# Patient Record
Sex: FEMALE | Race: WHITE | NOT HISPANIC OR LATINO | Employment: UNEMPLOYED | URBAN - METROPOLITAN AREA
[De-identification: names, ages, dates, MRNs, and addresses within clinical notes are randomized per-mention and may not be internally consistent; named-entity substitution may affect disease eponyms.]

---

## 2022-04-28 ENCOUNTER — HISTORICAL (OUTPATIENT)
Dept: LAB | Facility: HOSPITAL | Age: 37
End: 2022-04-28

## 2022-04-28 LAB
ABS NEUT (OLG): 4.4 (ref 1.5–6.9)
ALBUMIN SERPL-MCNC: 3.9 G/DL (ref 3.5–5)
ALBUMIN/GLOB SERPL: 1.3 {RATIO} (ref 1.1–2)
ALP SERPL-CCNC: 70 U/L (ref 40–150)
ALT SERPL-CCNC: 20 U/L (ref 0–55)
AMYLASE SERPL-CCNC: 68 U/L (ref 25–125)
AST SERPL-CCNC: 22 U/L (ref 5–34)
BASOPHILS # BLD AUTO: 0 10*3/UL (ref 0–0.1)
BASOPHILS NFR BLD AUTO: 0 % (ref 0–1)
BILIRUB SERPL-MCNC: 0.2 MG/DL
BILIRUBIN DIRECT+TOT PNL SERPL-MCNC: 0.1 (ref 0–0.5)
BILIRUBIN DIRECT+TOT PNL SERPL-MCNC: 0.1 (ref 0–0.8)
BUN SERPL-MCNC: 13 MG/DL (ref 7–18.7)
CALCIUM SERPL-MCNC: 9.2 MG/DL (ref 8.7–10.5)
CHLORIDE SERPL-SCNC: 111 MMOL/L (ref 98–107)
CO2 SERPL-SCNC: 25 MMOL/L (ref 22–29)
CREAT SERPL-MCNC: 0.73 MG/DL (ref 0.55–1.02)
EOSINOPHIL # BLD AUTO: 0.2 10*3/UL (ref 0–0.6)
EOSINOPHIL NFR BLD AUTO: 2 % (ref 0–5)
ERYTHROCYTE [DISTWIDTH] IN BLOOD BY AUTOMATED COUNT: 13.6 % (ref 11.5–17)
GLOBULIN SER-MCNC: 3 G/DL (ref 2.4–3.5)
GLUCOSE SERPL-MCNC: 57 MG/DL (ref 74–100)
HCT VFR BLD AUTO: 38.9 % (ref 36–48)
HEMOLYSIS INTERF INDEX SERPL-ACNC: 5
HGB BLD-MCNC: 11.9 G/DL (ref 12–16)
ICTERIC INTERF INDEX SERPL-ACNC: 0
IMM GRANULOCYTES # BLD AUTO: 0.03 10*3/UL (ref 0–0.02)
IMM GRANULOCYTES NFR BLD AUTO: 0.4 % (ref 0–0.43)
LIPASE SERPL-CCNC: 36 U/L
LIPEMIC INTERF INDEX SERPL-ACNC: 10
LYMPHOCYTES # BLD AUTO: 2.5 10*3/UL (ref 0.5–4.1)
LYMPHOCYTES NFR BLD AUTO: 32 % (ref 15–40)
MANUAL DIFF? (OHS): NO
MCH RBC QN AUTO: 28 PG (ref 27–34)
MCHC RBC AUTO-ENTMCNC: 31 G/DL (ref 31–36)
MCV RBC AUTO: 90 FL (ref 80–99)
MONOCYTES # BLD AUTO: 0.7 10*3/UL (ref 0–1.1)
MONOCYTES NFR BLD AUTO: 9 % (ref 4–12)
NEUTROPHILS # BLD AUTO: 4.4 10*3/UL (ref 1.5–6.9)
NEUTROPHILS NFR BLD AUTO: 55 % (ref 43–75)
PLATELET # BLD AUTO: 211 10*3/UL (ref 140–400)
PMV BLD AUTO: 10.5 FL (ref 6.8–10)
POTASSIUM SERPL-SCNC: 4.2 MMOL/L (ref 3.5–5.1)
PROT SERPL-MCNC: 6.9 G/DL (ref 6.4–8.3)
RBC # BLD AUTO: 4.31 10*6/UL (ref 4.2–5.4)
SODIUM SERPL-SCNC: 141 MMOL/L (ref 136–145)
WBC # SPEC AUTO: 7.9 10*3/UL (ref 4.5–11.5)

## 2022-04-29 ENCOUNTER — HISTORICAL (OUTPATIENT)
Dept: RADIOLOGY | Facility: HOSPITAL | Age: 37
End: 2022-04-29

## 2022-06-01 ENCOUNTER — APPOINTMENT (OUTPATIENT)
Dept: LAB | Facility: HOSPITAL | Age: 37
End: 2022-06-01
Attending: NURSE PRACTITIONER
Payer: MEDICAID

## 2022-06-01 PROCEDURE — 30000890 MAYO GENERIC ORDERABLE

## 2022-06-01 PROCEDURE — 81162 BRCA1&2 GEN FULL SEQ DUP/DEL: CPT

## 2022-07-06 DIAGNOSIS — R11.2 NAUSEA AND VOMITING, INTRACTABILITY OF VOMITING NOT SPECIFIED, UNSPECIFIED VOMITING TYPE: Primary | ICD-10-CM

## 2022-07-06 DIAGNOSIS — R10.9 ABDOMINAL PAIN, UNSPECIFIED ABDOMINAL LOCATION: ICD-10-CM

## 2022-08-23 ENCOUNTER — HISTORICAL (OUTPATIENT)
Dept: ADMINISTRATIVE | Facility: HOSPITAL | Age: 37
End: 2022-08-23

## 2022-09-14 ENCOUNTER — HISTORICAL (OUTPATIENT)
Dept: ADMINISTRATIVE | Facility: HOSPITAL | Age: 37
End: 2022-09-14

## 2022-10-10 ENCOUNTER — HISTORICAL (OUTPATIENT)
Dept: ADMINISTRATIVE | Facility: HOSPITAL | Age: 37
End: 2022-10-10

## 2022-10-18 ENCOUNTER — HISTORICAL (OUTPATIENT)
Dept: ADMINISTRATIVE | Facility: HOSPITAL | Age: 37
End: 2022-10-18

## 2022-10-28 ENCOUNTER — HISTORICAL (OUTPATIENT)
Dept: ADMINISTRATIVE | Facility: HOSPITAL | Age: 37
End: 2022-10-28

## 2022-11-30 ENCOUNTER — HISTORICAL (OUTPATIENT)
Dept: ADMINISTRATIVE | Facility: HOSPITAL | Age: 37
End: 2022-11-30

## 2022-12-01 ENCOUNTER — HISTORICAL (OUTPATIENT)
Dept: ADMINISTRATIVE | Facility: HOSPITAL | Age: 37
End: 2022-12-01

## 2022-12-01 LAB
HUMAN PAPILLOMAVIRUS (HPV): NORMAL
PAP RECOMMENDATION EXT: NORMAL
PAP SMEAR: NORMAL

## 2022-12-09 ENCOUNTER — HISTORICAL (OUTPATIENT)
Dept: ADMINISTRATIVE | Facility: HOSPITAL | Age: 37
End: 2022-12-09

## 2023-01-06 ENCOUNTER — HISTORICAL (OUTPATIENT)
Dept: ADMINISTRATIVE | Facility: HOSPITAL | Age: 38
End: 2023-01-06
Payer: MEDICAID

## 2023-01-18 ENCOUNTER — DOCUMENTATION ONLY (OUTPATIENT)
Dept: ADMINISTRATIVE | Facility: HOSPITAL | Age: 38
End: 2023-01-18
Payer: MEDICAID

## 2023-02-16 ENCOUNTER — TELEPHONE (OUTPATIENT)
Dept: FAMILY MEDICINE | Facility: CLINIC | Age: 38
End: 2023-02-16
Payer: MEDICAID

## 2023-02-16 NOTE — TELEPHONE ENCOUNTER
----- Message from Nini Will MA sent at 2/16/2023 10:46 AM CST -----  Patient would like an appt today. She has one Tuesday but does not want to wait that long. She is in a lot of pain and has a lot going on--sound like anxiety on the phone but she didn't say that just that she has a lot going on and needs to see Cheyanne today.    [phone]

## 2023-02-16 NOTE — TELEPHONE ENCOUNTER
----- Message from Cheyanne Azar MA sent at 2/16/2023 10:35 AM CST -----  Regarding: RE: call pt back  Pt would like a referral to [facility] for her migraines and due to the results of her EMG  ----- Message -----  From: Laureen Hernandez  Sent: 2/15/2023   4:13 PM CST  To: Cheyanne Azar MA  Subject: call pt back                                     Pt. Would like for you to find her an closer rheumatologist [facility] around [facility].    Please return her call [phone]

## 2023-02-16 NOTE — TELEPHONE ENCOUNTER
Called and informed pt that she can speak to Cheyanne Becerra about the referral on her up coming appt Wed. She voiced understanding.

## 2023-02-20 RX ORDER — DICLOFENAC SODIUM 50 MG/1
50 TABLET, DELAYED RELEASE ORAL 2 TIMES DAILY
COMMUNITY
Start: 2023-01-31 | End: 2023-02-22

## 2023-02-20 RX ORDER — DEXTROAMPHETAMINE SULFATE, DEXTROAMPHETAMINE SACCHARATE, AMPHETAMINE SULFATE AND AMPHETAMINE ASPARTATE 5; 5; 5; 5 MG/1; MG/1; MG/1; MG/1
1 CAPSULE, EXTENDED RELEASE ORAL EVERY MORNING
COMMUNITY
Start: 2023-01-30 | End: 2023-02-22

## 2023-02-20 RX ORDER — LEVOTHYROXINE SODIUM 50 UG/1
50 TABLET ORAL DAILY
COMMUNITY
Start: 2023-01-30 | End: 2023-06-06 | Stop reason: SDUPTHER

## 2023-02-20 RX ORDER — FERROUS SULFATE 325(65) MG
1 TABLET, DELAYED RELEASE (ENTERIC COATED) ORAL EVERY OTHER DAY
COMMUNITY
Start: 2023-01-06 | End: 2023-03-02

## 2023-02-20 RX ORDER — IBUPROFEN 600 MG/1
600 TABLET ORAL 3 TIMES DAILY
COMMUNITY
Start: 2022-12-02 | End: 2023-02-22

## 2023-02-20 RX ORDER — DICLOFENAC SODIUM 10 MG/G
2 GEL TOPICAL
COMMUNITY
Start: 2022-12-08 | End: 2023-02-22

## 2023-02-20 RX ORDER — BUSPIRONE HYDROCHLORIDE 15 MG/1
1 TABLET ORAL DAILY
COMMUNITY
Start: 2023-02-15 | End: 2023-02-22 | Stop reason: DRUGHIGH

## 2023-02-20 RX ORDER — GABAPENTIN 400 MG/1
400 CAPSULE ORAL NIGHTLY
COMMUNITY
Start: 2023-01-30

## 2023-02-20 RX ORDER — BUPROPION HCL 300 MG
300 TABLET, EXTENDED RELEASE 24 HR ORAL EVERY MORNING
COMMUNITY
Start: 2023-01-30

## 2023-02-22 ENCOUNTER — OFFICE VISIT (OUTPATIENT)
Dept: FAMILY MEDICINE | Facility: CLINIC | Age: 38
End: 2023-02-22
Payer: MEDICAID

## 2023-02-22 VITALS
WEIGHT: 154.31 LBS | BODY MASS INDEX: 25.71 KG/M2 | DIASTOLIC BLOOD PRESSURE: 70 MMHG | HEART RATE: 77 BPM | SYSTOLIC BLOOD PRESSURE: 118 MMHG | TEMPERATURE: 99 F | OXYGEN SATURATION: 99 % | HEIGHT: 65 IN

## 2023-02-22 DIAGNOSIS — E04.2 MULTIPLE THYROID NODULES: ICD-10-CM

## 2023-02-22 DIAGNOSIS — G43.809 OTHER MIGRAINE WITHOUT STATUS MIGRAINOSUS, NOT INTRACTABLE: ICD-10-CM

## 2023-02-22 DIAGNOSIS — R76.8 POSITIVE ANA (ANTINUCLEAR ANTIBODY): ICD-10-CM

## 2023-02-22 DIAGNOSIS — M25.50 ARTHRALGIA, UNSPECIFIED JOINT: Primary | ICD-10-CM

## 2023-02-22 PROBLEM — Z98.890 HISTORY OF AUGMENTATION OF BOTH BREASTS: Status: ACTIVE | Noted: 2023-02-22

## 2023-02-22 PROBLEM — Z86.711 HISTORY OF PULMONARY EMBOLISM: Status: ACTIVE | Noted: 2023-02-22

## 2023-02-22 PROBLEM — E03.9 HYPOTHYROIDISM: Status: ACTIVE | Noted: 2023-02-22

## 2023-02-22 PROBLEM — N64.4 BREAST PAIN: Status: ACTIVE | Noted: 2023-02-22

## 2023-02-22 PROBLEM — K83.8 COMMON BILE DUCT DILATION: Status: ACTIVE | Noted: 2021-04-26

## 2023-02-22 PROBLEM — F43.10 PTSD (POST-TRAUMATIC STRESS DISORDER): Status: ACTIVE | Noted: 2023-02-22

## 2023-02-22 PROBLEM — I82.811 ACUTE SUPERFICIAL VENOUS THROMBOSIS OF RIGHT LOWER EXTREMITY: Status: ACTIVE | Noted: 2021-04-26

## 2023-02-22 PROBLEM — M79.7 FIBROMYALGIA: Status: ACTIVE | Noted: 2023-02-22

## 2023-02-22 PROBLEM — F33.9 MAJOR DEPRESSION, RECURRENT: Status: ACTIVE | Noted: 2023-02-22

## 2023-02-22 PROBLEM — M54.2 CERVICALGIA: Status: ACTIVE | Noted: 2023-02-22

## 2023-02-22 PROBLEM — T14.8XXA INJURY TO NERVES: Status: ACTIVE | Noted: 2023-02-22

## 2023-02-22 PROBLEM — F41.9 ANXIETY: Status: ACTIVE | Noted: 2023-02-22

## 2023-02-22 PROBLEM — Z80.3 FAMILY HISTORY OF BREAST CANCER: Status: ACTIVE | Noted: 2023-02-22

## 2023-02-22 PROBLEM — M13.0 POLYARTHRITIS: Status: ACTIVE | Noted: 2023-02-22

## 2023-02-22 PROBLEM — R51.9 FREQUENT HEADACHES: Status: ACTIVE | Noted: 2023-02-22

## 2023-02-22 PROBLEM — D50.9 IRON DEFICIENCY ANEMIA: Status: ACTIVE | Noted: 2023-02-22

## 2023-02-22 PROCEDURE — 82728 ASSAY OF FERRITIN: CPT | Performed by: NURSE PRACTITIONER

## 2023-02-22 PROCEDURE — 3078F DIAST BP <80 MM HG: CPT | Mod: CPTII,,, | Performed by: NURSE PRACTITIONER

## 2023-02-22 PROCEDURE — 1159F PR MEDICATION LIST DOCUMENTED IN MEDICAL RECORD: ICD-10-PCS | Mod: CPTII,,, | Performed by: NURSE PRACTITIONER

## 2023-02-22 PROCEDURE — 1160F PR REVIEW ALL MEDS BY PRESCRIBER/CLIN PHARMACIST DOCUMENTED: ICD-10-PCS | Mod: CPTII,,, | Performed by: NURSE PRACTITIONER

## 2023-02-22 PROCEDURE — 83550 IRON BINDING TEST: CPT | Performed by: NURSE PRACTITIONER

## 2023-02-22 PROCEDURE — 84443 ASSAY THYROID STIM HORMONE: CPT | Performed by: NURSE PRACTITIONER

## 2023-02-22 PROCEDURE — 1159F MED LIST DOCD IN RCRD: CPT | Mod: CPTII,,, | Performed by: NURSE PRACTITIONER

## 2023-02-22 PROCEDURE — 99214 OFFICE O/P EST MOD 30 MIN: CPT | Mod: ,,, | Performed by: NURSE PRACTITIONER

## 2023-02-22 PROCEDURE — 85025 COMPLETE CBC W/AUTO DIFF WBC: CPT | Performed by: NURSE PRACTITIONER

## 2023-02-22 PROCEDURE — 3008F PR BODY MASS INDEX (BMI) DOCUMENTED: ICD-10-PCS | Mod: CPTII,,, | Performed by: NURSE PRACTITIONER

## 2023-02-22 PROCEDURE — 99214 PR OFFICE/OUTPT VISIT, EST, LEVL IV, 30-39 MIN: ICD-10-PCS | Mod: ,,, | Performed by: NURSE PRACTITIONER

## 2023-02-22 PROCEDURE — 3074F SYST BP LT 130 MM HG: CPT | Mod: CPTII,,, | Performed by: NURSE PRACTITIONER

## 2023-02-22 PROCEDURE — 3074F PR MOST RECENT SYSTOLIC BLOOD PRESSURE < 130 MM HG: ICD-10-PCS | Mod: CPTII,,, | Performed by: NURSE PRACTITIONER

## 2023-02-22 PROCEDURE — 1160F RVW MEDS BY RX/DR IN RCRD: CPT | Mod: CPTII,,, | Performed by: NURSE PRACTITIONER

## 2023-02-22 PROCEDURE — 3008F BODY MASS INDEX DOCD: CPT | Mod: CPTII,,, | Performed by: NURSE PRACTITIONER

## 2023-02-22 PROCEDURE — 3078F PR MOST RECENT DIASTOLIC BLOOD PRESSURE < 80 MM HG: ICD-10-PCS | Mod: CPTII,,, | Performed by: NURSE PRACTITIONER

## 2023-02-22 PROCEDURE — 83540 ASSAY OF IRON: CPT | Performed by: NURSE PRACTITIONER

## 2023-02-22 RX ORDER — DEXTROAMPHETAMINE SULFATE, DEXTROAMPHETAMINE SACCHARATE, AMPHETAMINE SULFATE AND AMPHETAMINE ASPARTATE 7.5; 7.5; 7.5; 7.5 MG/1; MG/1; MG/1; MG/1
1 CAPSULE, EXTENDED RELEASE ORAL EVERY MORNING
COMMUNITY
Start: 2023-02-20

## 2023-02-22 RX ORDER — BUSPIRONE HYDROCHLORIDE 7.5 MG/1
7.5 TABLET ORAL DAILY
COMMUNITY
Start: 2023-02-20

## 2023-02-22 RX ORDER — PREDNISONE 20 MG/1
40 TABLET ORAL DAILY
Qty: 10 TABLET | Refills: 0 | Status: SHIPPED | OUTPATIENT
Start: 2023-02-22 | End: 2023-03-02

## 2023-02-22 NOTE — PROGRESS NOTES
Patient ID: Bethanie Umana is a 37 y.o. female.    Chief Complaint: Knee Pain (L knee ) and Neck Pain                     History of Present Illness:  The patient is 37 y.o. White female for evaluation and management with a chief complaint of Knee Pain (L knee ) and Neck Pain .  She continues to complain of generalized joint pain.  Pain is most pronounced to bilateral hands and feet.  She complains of pain, warmth, and edema to all joints of hands and wrist.  Morning stiffness lasting over 1.5 hours in duration.  Has tried multiple NSAIDs in the past including naproxen, ibuprofen, and diclofenac without improvement.  Referral was sent to rheumatology (positive SHAHZAD) at [facility] but she does not have an appointment for the next 3-4 months.  She is requesting referral to [facility].  She reports that pain has become debilitating and is now hard to do her daily activities.  Hands are very easily fatigued and she often has to stop to rest.  She reports that she never had issues with her joints until she had breast augmentation in 2017.     Continues to complain of pain to her thoracic spine.  She reports that it is very tender to the touch.  She describes as a very sharp, intense pain.  It worsens with movement or with direct pressure.  X-ray was normal in the past.    Would like a referral to Dr. Landa for migraine headaches.  She reports a daily headache.  In addition to the chronic daily headache, she experiences migraines several times per month that last up to 3 days in duration.  She reports experiencing at least 10-15 headaches a month.  She has had these headaches for the last several years.  Headaches associated with nausea, vomiting, and photophobia.  She reports positive family history of migraines in dad and sister.  Received Botox injections while living in [place].      ROS: See HPI      Past Medical History:  has a past medical history of Anxiety, Breast pain, Cervicalgia,  "Fibromyalgia, Frequent headaches, History of pulmonary embolism, Hypothyroidism, Iron deficiency anemia, Major depression, recurrent, Multiple thyroid nodules, Nerve damage, Polyarthritis, and PTSD (post-traumatic stress disorder).    Current Outpatient Medications   Medication Instructions    ADDERALL XR 30 mg 24 hr capsule 1 capsule, Oral, Every morning    busPIRone (BUSPAR) 7.5 mg, Oral, 3 times daily    ferrous sulfate 325 (65 FE) MG EC tablet 1 tablet, Oral, Every other day    gabapentin (NEURONTIN) 400 mg, Oral, Nightly    levothyroxine (SYNTHROID) 50 mcg, Oral, Daily    predniSONE (DELTASONE) 40 mg, Oral, Daily    WELLBUTRIN  mg, Oral, Every morning       Patient is allergic to latex, penicillin, sulfa (sulfonamide antibiotics), sulfur, and adhesive tape-silicones.       Visit Vitals  /70 (BP Location: Right arm, Patient Position: Sitting)   Pulse 77   Temp 98.6 °F (37 °C) (Temporal)   Ht 5' 4.5" (1.638 m)   Wt 70 kg (154 lb 5.2 oz)   LMP 01/22/2023 (Approximate)   SpO2 99%   BMI 26.08 kg/m²         Physical Exam  Vitals reviewed.   Constitutional:       Appearance: Normal appearance.   Cardiovascular:      Rate and Rhythm: Normal rate and regular rhythm.      Heart sounds: Normal heart sounds.   Pulmonary:      Effort: Pulmonary effort is normal.      Breath sounds: Normal breath sounds.   Abdominal:      General: Bowel sounds are normal.      Palpations: Abdomen is soft.   Musculoskeletal:         General: Tenderness (Generalized to all joints. No edema or erythema) present. Normal range of motion.      Thoracic back: Tenderness and bony tenderness present.   Skin:     General: Skin is warm and dry.   Neurological:      Mental Status: She is alert.   Psychiatric:         Thought Content: Thought content normal.         Judgment: Judgment normal.             Assessment/Plan:    ICD-10-CM ICD-9-CM   1. Arthralgia, unspecified joint  M25.50 719.40   2. Other migraine without status migrainosus, not " intractable  G43.809 346.80   3. Positive SHAHZAD (antinuclear antibody)  R76.8 795.79       1. Arthralgia, unspecified joint  No improvement with NSAIDs  - predniSONE (DELTASONE) 20 MG tablet; Take 2 tablets (40 mg total) by mouth once daily. for 5 days  Dispense: 10 tablet; Refill: 0  - Ambulatory referral/consult to Rheumatology; Future    2. Other migraine without status migrainosus, not intractable  - Ambulatory referral/consult to Neurology at request of patient     3. Positive SHAHZAD (antinuclear antibody)  - Ambulatory referral/consult to Rheumatology; Future     4. Pain in thoracic spine  X-ray t spine 10/10/22: No acute fractures or spondylolisthesis  are noted.  No improvement with NSAIDs  Encourage massage, ice/heat  Refer to PT     Keep scheduled follow-up return to clinic sooner if needed    Future Appointments   Date Time Provider Department Center   2/24/2023 10:10 AM LAB, [facility] LABORATORY DRAW STATION JOANNA Horner [facility]   3/2/2023  1:00 PM ANASTASIYA Stern [facility]   5/4/2023  3:00 PM RHEUMATOLOGY ACCESS CLINIC [facility]         ANASTASIYA Stern

## 2023-02-24 ENCOUNTER — TELEPHONE (OUTPATIENT)
Dept: FAMILY MEDICINE | Facility: CLINIC | Age: 38
End: 2023-02-24
Payer: MEDICAID

## 2023-02-24 NOTE — TELEPHONE ENCOUNTER
----- Message from ANASTASIYA Stern sent at 2/23/2023  2:06 PM CST -----  Please have her stop her iron.  Her iron levels are a little above normal.  We can discuss the rest of her results at her upcoming appointment.

## 2023-03-02 ENCOUNTER — OFFICE VISIT (OUTPATIENT)
Dept: FAMILY MEDICINE | Facility: CLINIC | Age: 38
End: 2023-03-02
Payer: MEDICAID

## 2023-03-02 VITALS
TEMPERATURE: 98 F | BODY MASS INDEX: 26.08 KG/M2 | HEART RATE: 60 BPM | DIASTOLIC BLOOD PRESSURE: 70 MMHG | OXYGEN SATURATION: 99 % | HEIGHT: 64 IN | WEIGHT: 152.75 LBS | SYSTOLIC BLOOD PRESSURE: 110 MMHG

## 2023-03-02 DIAGNOSIS — E03.9 HYPOTHYROIDISM, UNSPECIFIED TYPE: ICD-10-CM

## 2023-03-02 DIAGNOSIS — D50.9 IRON DEFICIENCY ANEMIA, UNSPECIFIED IRON DEFICIENCY ANEMIA TYPE: Primary | ICD-10-CM

## 2023-03-02 PROBLEM — F43.10 POSTTRAUMATIC STRESS DISORDER: Status: ACTIVE | Noted: 2022-07-01

## 2023-03-02 PROBLEM — N76.6 GENITAL LABIAL ULCER: Status: ACTIVE | Noted: 2022-01-02

## 2023-03-02 PROBLEM — E66.3 OVERWEIGHT WITH BODY MASS INDEX (BMI) 25.0-29.9: Status: ACTIVE | Noted: 2023-03-02

## 2023-03-02 PROCEDURE — 1159F MED LIST DOCD IN RCRD: CPT | Mod: CPTII,,, | Performed by: NURSE PRACTITIONER

## 2023-03-02 PROCEDURE — 1160F RVW MEDS BY RX/DR IN RCRD: CPT | Mod: CPTII,,, | Performed by: NURSE PRACTITIONER

## 2023-03-02 PROCEDURE — 3008F PR BODY MASS INDEX (BMI) DOCUMENTED: ICD-10-PCS | Mod: CPTII,,, | Performed by: NURSE PRACTITIONER

## 2023-03-02 PROCEDURE — 99213 PR OFFICE/OUTPT VISIT, EST, LEVL III, 20-29 MIN: ICD-10-PCS | Mod: ,,, | Performed by: NURSE PRACTITIONER

## 2023-03-02 PROCEDURE — 3074F SYST BP LT 130 MM HG: CPT | Mod: CPTII,,, | Performed by: NURSE PRACTITIONER

## 2023-03-02 PROCEDURE — 3078F PR MOST RECENT DIASTOLIC BLOOD PRESSURE < 80 MM HG: ICD-10-PCS | Mod: CPTII,,, | Performed by: NURSE PRACTITIONER

## 2023-03-02 PROCEDURE — 99213 OFFICE O/P EST LOW 20 MIN: CPT | Mod: ,,, | Performed by: NURSE PRACTITIONER

## 2023-03-02 PROCEDURE — 3008F BODY MASS INDEX DOCD: CPT | Mod: CPTII,,, | Performed by: NURSE PRACTITIONER

## 2023-03-02 PROCEDURE — 1159F PR MEDICATION LIST DOCUMENTED IN MEDICAL RECORD: ICD-10-PCS | Mod: CPTII,,, | Performed by: NURSE PRACTITIONER

## 2023-03-02 PROCEDURE — 1160F PR REVIEW ALL MEDS BY PRESCRIBER/CLIN PHARMACIST DOCUMENTED: ICD-10-PCS | Mod: CPTII,,, | Performed by: NURSE PRACTITIONER

## 2023-03-02 PROCEDURE — 3074F PR MOST RECENT SYSTOLIC BLOOD PRESSURE < 130 MM HG: ICD-10-PCS | Mod: CPTII,,, | Performed by: NURSE PRACTITIONER

## 2023-03-02 PROCEDURE — 3078F DIAST BP <80 MM HG: CPT | Mod: CPTII,,, | Performed by: NURSE PRACTITIONER

## 2023-03-02 RX ORDER — GABAPENTIN 100 MG/1
1 CAPSULE ORAL 2 TIMES DAILY
COMMUNITY
Start: 2023-02-24

## 2023-03-02 NOTE — PROGRESS NOTES
"Patient ID: Bethanie Umana is a 37 y.o. female.    Chief Complaint: Anemia (Follow up)                     History of Present Illness:  The patient is 37 y.o. White female for evaluation and management with a chief complaint of Anemia (Follow up) .  No new complaints or concerns today.  She reports compliance with all medications.  She recently stopped her oral iron due to elevated iron level.  She is awaiting initial appointment for Rheumatology due to joint pain and positive SHAHZAD.    ROS: See HPI      Past Medical History:  has a past medical history of Anxiety, Breast pain, Cervicalgia, Fibromyalgia, Frequent headaches, History of pulmonary embolism, Hypothyroidism, Iron deficiency anemia, Major depression, recurrent, Multiple thyroid nodules, Nerve damage, Polyarthritis, and PTSD (post-traumatic stress disorder).    Current Outpatient Medications   Medication Instructions    ADDERALL XR 30 mg 24 hr capsule 1 capsule, Oral, Every morning    busPIRone (BUSPAR) 7.5 mg, Oral, 3 times daily    gabapentin (NEURONTIN) 100 MG capsule 1 capsule, Oral, Daily    gabapentin (NEURONTIN) 400 mg, Oral, Nightly    levothyroxine (SYNTHROID) 50 mcg, Oral, Daily    WELLBUTRIN  mg, Oral, Every morning       Patient is allergic to latex, penicillin, sulfa (sulfonamide antibiotics), sulfur, and adhesive tape-silicones.       Visit Vitals  /70 (BP Location: Right arm, Patient Position: Sitting)   Pulse 60   Temp 97.9 °F (36.6 °C) (Temporal)   Ht 5' 4.05" (1.627 m)   Wt 69.3 kg (152 lb 12.5 oz)   LMP 02/28/2023 (Exact Date)   SpO2 99%   BMI 26.18 kg/m²         Physical Exam  Vitals reviewed.   Constitutional:       Appearance: Normal appearance.   Cardiovascular:      Rate and Rhythm: Normal rate and regular rhythm.      Heart sounds: Normal heart sounds.   Pulmonary:      Effort: Pulmonary effort is normal.      Breath sounds: Normal breath sounds.   Abdominal:      General: Bowel sounds are normal.      Palpations: " Abdomen is soft.   Skin:     General: Skin is warm and dry.   Neurological:      General: No focal deficit present.      Mental Status: She is alert. Mental status is at baseline.           Assessment/Plan:    ICD-10-CM ICD-9-CM   1. Iron deficiency anemia, unspecified iron deficiency anemia type  D50.9 280.9   2. Hypothyroidism, unspecified type  E03.9 244.9       1. Iron deficiency anemia, unspecified iron deficiency anemia type  Iron level elevated at 300, ferritin 13.3  Patient has already stopped oral iron   Repeat CBC with iron studies in 3 months    2. Hypothyroidism, unspecified type  TSH suppressed at 0.075,TSH within normal limits for the past 5-6 months prior  Continue levothyroxine 50 mcg daily.  Would like to repeat TSH with free T4 in 1 month prior to adjusting levothyroxine dose         Follow up in about 3 months (around 6/2/2023) for With Fasting Labs Prior. or sooner as needed.    Future Appointments   Date Time Provider Department Center   3/29/2023  9:30 AM LAB, [facility] LABORATORY DRAW STATION PRIYA JAZ Caro   5/4/2023  3:00 PM RHEUMATOLOGY ACCESS CLINIC [facility]   5/31/2023  8:20 AM LAB [facility] LABORATORY DRAW STATION PRIYA JAZ Horner [facility]   6/6/2023  1:30 PM ANASTASIYA Stern FNP

## 2023-03-14 ENCOUNTER — OFFICE VISIT (OUTPATIENT)
Dept: FAMILY MEDICINE | Facility: CLINIC | Age: 38
End: 2023-03-14
Payer: MEDICAID

## 2023-03-14 VITALS
SYSTOLIC BLOOD PRESSURE: 122 MMHG | OXYGEN SATURATION: 96 % | HEIGHT: 64 IN | WEIGHT: 149.19 LBS | DIASTOLIC BLOOD PRESSURE: 76 MMHG | TEMPERATURE: 98 F | BODY MASS INDEX: 25.47 KG/M2 | HEART RATE: 95 BPM

## 2023-03-14 DIAGNOSIS — M79.642 PAIN IN LEFT HAND: Primary | ICD-10-CM

## 2023-03-14 DIAGNOSIS — M54.12 CERVICAL RADICULOPATHY AT C7: ICD-10-CM

## 2023-03-14 PROCEDURE — 1160F PR REVIEW ALL MEDS BY PRESCRIBER/CLIN PHARMACIST DOCUMENTED: ICD-10-PCS | Mod: CPTII,,, | Performed by: NURSE PRACTITIONER

## 2023-03-14 PROCEDURE — 3008F BODY MASS INDEX DOCD: CPT | Mod: CPTII,,, | Performed by: NURSE PRACTITIONER

## 2023-03-14 PROCEDURE — 99213 OFFICE O/P EST LOW 20 MIN: CPT | Mod: ,,, | Performed by: NURSE PRACTITIONER

## 2023-03-14 PROCEDURE — 3074F PR MOST RECENT SYSTOLIC BLOOD PRESSURE < 130 MM HG: ICD-10-PCS | Mod: CPTII,,, | Performed by: NURSE PRACTITIONER

## 2023-03-14 PROCEDURE — 3008F PR BODY MASS INDEX (BMI) DOCUMENTED: ICD-10-PCS | Mod: CPTII,,, | Performed by: NURSE PRACTITIONER

## 2023-03-14 PROCEDURE — 99213 PR OFFICE/OUTPT VISIT, EST, LEVL III, 20-29 MIN: ICD-10-PCS | Mod: ,,, | Performed by: NURSE PRACTITIONER

## 2023-03-14 PROCEDURE — 3078F PR MOST RECENT DIASTOLIC BLOOD PRESSURE < 80 MM HG: ICD-10-PCS | Mod: CPTII,,, | Performed by: NURSE PRACTITIONER

## 2023-03-14 PROCEDURE — 1160F RVW MEDS BY RX/DR IN RCRD: CPT | Mod: CPTII,,, | Performed by: NURSE PRACTITIONER

## 2023-03-14 PROCEDURE — 3074F SYST BP LT 130 MM HG: CPT | Mod: CPTII,,, | Performed by: NURSE PRACTITIONER

## 2023-03-14 PROCEDURE — 1159F PR MEDICATION LIST DOCUMENTED IN MEDICAL RECORD: ICD-10-PCS | Mod: CPTII,,, | Performed by: NURSE PRACTITIONER

## 2023-03-14 PROCEDURE — 3078F DIAST BP <80 MM HG: CPT | Mod: CPTII,,, | Performed by: NURSE PRACTITIONER

## 2023-03-14 PROCEDURE — 1159F MED LIST DOCD IN RCRD: CPT | Mod: CPTII,,, | Performed by: NURSE PRACTITIONER

## 2023-03-14 NOTE — PROGRESS NOTES
"Patient ID: Bethanie Umana is a 37 y.o. female.    Chief Complaint: Arm Pain                     History of Present Illness:  The patient is 37 y.o. White female for evaluation and management with a chief complaint of Arm Pain .  She complains of pain to left hand and arm.  Pain is present to 2nd, 3rd, and 4th digits of left hand, palm of left hand, and lower left forearm.  She describes as a very intense, burning sensation.  She also experiences significant pain with gripping any object.  Pain is drastically worse with movement and overuse.  Minimal improvement with NSAIDs and prednisone.  Completed a few sessions of PT without improvement. Awaiting appointment with rheumatology at [facility].    EMG 1/10/2023:  Mild left ulnar and median neuropathies, chronic bilateral C7 radiculopathy with left worse than the right.      ROS: See HPI      Past Medical History:  has a past medical history of Anxiety, Breast pain, Cervicalgia, Fibromyalgia, Frequent headaches, History of pulmonary embolism, Hypothyroidism, Iron deficiency anemia, Major depression, recurrent, Multiple thyroid nodules, Nerve damage, Polyarthritis, and PTSD (post-traumatic stress disorder).    Current Outpatient Medications   Medication Instructions    ADDERALL XR 30 mg 24 hr capsule 1 capsule, Oral, Every morning    busPIRone (BUSPAR) 7.5 mg, Oral, 3 times daily    gabapentin (NEURONTIN) 100 MG capsule 1 capsule, Oral, Daily    gabapentin (NEURONTIN) 400 mg, Oral, Nightly    levothyroxine (SYNTHROID) 50 mcg, Oral, Daily    WELLBUTRIN  mg, Oral, Every morning       Patient is allergic to latex, penicillin, sulfa (sulfonamide antibiotics), sulfur, and adhesive tape-silicones.       Visit Vitals  /76 (BP Location: Left arm)   Pulse 95   Temp 97.9 °F (36.6 °C) (Temporal)   Ht 5' 4.05" (1.627 m)   Wt 67.7 kg (149 lb 3.2 oz)   LMP 02/28/2023 (Exact Date)   SpO2 96%   BMI 25.57 kg/m²         Physical Exam  Vitals reviewed.   Constitutional:       " Appearance: Normal appearance.   Cardiovascular:      Rate and Rhythm: Normal rate and regular rhythm.      Heart sounds: Normal heart sounds.   Pulmonary:      Effort: Pulmonary effort is normal.      Breath sounds: Normal breath sounds.   Abdominal:      General: Bowel sounds are normal.      Palpations: Abdomen is soft.   Musculoskeletal:      Cervical back: Normal range of motion and neck supple.      Comments: Tenderness to cervical spine. No edema/erythema/warmth to joints of left hand but tender to palpation. Hand  equal and strong   Skin:     General: Skin is warm and dry.   Neurological:      General: No focal deficit present.      Mental Status: She is alert and oriented to person, place, and time. Mental status is at baseline.         Assessment/Plan:    ICD-10-CM ICD-9-CM   1. Pain in left hand  M79.642 729.5   2. Cervical radiculopathy at C7  M54.12 723.4       1. Pain in left hand  Secondary to C7 radiculopathy    2. Cervical radiculopathy at C7  Restart PT   Home exercises  Failed NSAIDs, no improvement with prednisone       Keep scheduled f/u or RTC sooner as needed.    Future Appointments   Date Time Provider Department Center   3/29/2023  9:30 AM LAB, [facility] LABORATORY DRAW STATION JOANNA Horner [facility]   5/4/2023  3:00 PM RHEUMATOLOGY ACCESS CLINIC [facility]   5/31/2023  8:20 AM LAB, [facility] LABORATORY DRAW STATION JOANNA Horner [facility]   6/6/2023  1:30 PM ANASTASIYA Stern FNP

## 2023-03-23 ENCOUNTER — OFFICE VISIT (OUTPATIENT)
Dept: FAMILY MEDICINE | Facility: CLINIC | Age: 38
End: 2023-03-23
Payer: MEDICAID

## 2023-03-23 VITALS
HEIGHT: 64 IN | DIASTOLIC BLOOD PRESSURE: 80 MMHG | TEMPERATURE: 97 F | BODY MASS INDEX: 25.47 KG/M2 | WEIGHT: 149.19 LBS | OXYGEN SATURATION: 99 % | HEART RATE: 90 BPM | SYSTOLIC BLOOD PRESSURE: 130 MMHG

## 2023-03-23 DIAGNOSIS — M79.7 FIBROMYALGIA: ICD-10-CM

## 2023-03-23 DIAGNOSIS — E03.9 HYPOTHYROIDISM, UNSPECIFIED TYPE: ICD-10-CM

## 2023-03-23 DIAGNOSIS — L73.9 FOLLICULITIS: Primary | ICD-10-CM

## 2023-03-23 DIAGNOSIS — M62.838 MUSCLE SPASMS OF BOTH LOWER EXTREMITIES: ICD-10-CM

## 2023-03-23 LAB
ANION GAP SERPL CALC-SCNC: 5 MEQ/L (ref 2–13)
BUN SERPL-MCNC: 11 MG/DL (ref 7–20)
CALCIUM SERPL-MCNC: 9.7 MG/DL (ref 8.4–10.2)
CHLORIDE SERPL-SCNC: 105 MMOL/L (ref 98–110)
CO2 SERPL-SCNC: 30 MMOL/L (ref 21–32)
CREAT SERPL-MCNC: 0.63 MG/DL (ref 0.66–1.25)
CREAT/UREA NIT SERPL: 17 (ref 12–20)
GFR SERPLBLD CREATININE-BSD FMLA CKD-EPI: >90 MLS/MIN/1.73/M2
GLUCOSE SERPL-MCNC: 88 MG/DL (ref 70–115)
MAGNESIUM SERPL-MCNC: 2.1 MG/DL (ref 1.8–2.4)
POTASSIUM SERPL-SCNC: 4.5 MMOL/L (ref 3.5–5.1)
SODIUM SERPL-SCNC: 140 MMOL/L (ref 135–145)
T4 FREE SERPL-MCNC: 1.02 NG/DL (ref 0.78–2.19)
TSH SERPL-ACNC: 2.59 UIU/ML (ref 0.36–3.74)

## 2023-03-23 PROCEDURE — 3008F PR BODY MASS INDEX (BMI) DOCUMENTED: ICD-10-PCS | Mod: CPTII,,, | Performed by: NURSE PRACTITIONER

## 2023-03-23 PROCEDURE — 3079F PR MOST RECENT DIASTOLIC BLOOD PRESSURE 80-89 MM HG: ICD-10-PCS | Mod: CPTII,,, | Performed by: NURSE PRACTITIONER

## 2023-03-23 PROCEDURE — 1160F RVW MEDS BY RX/DR IN RCRD: CPT | Mod: CPTII,,, | Performed by: NURSE PRACTITIONER

## 2023-03-23 PROCEDURE — 3008F BODY MASS INDEX DOCD: CPT | Mod: CPTII,,, | Performed by: NURSE PRACTITIONER

## 2023-03-23 PROCEDURE — 99214 OFFICE O/P EST MOD 30 MIN: CPT | Mod: ,,, | Performed by: NURSE PRACTITIONER

## 2023-03-23 PROCEDURE — 1159F MED LIST DOCD IN RCRD: CPT | Mod: CPTII,,, | Performed by: NURSE PRACTITIONER

## 2023-03-23 PROCEDURE — 99214 PR OFFICE/OUTPT VISIT, EST, LEVL IV, 30-39 MIN: ICD-10-PCS | Mod: ,,, | Performed by: NURSE PRACTITIONER

## 2023-03-23 PROCEDURE — 1159F PR MEDICATION LIST DOCUMENTED IN MEDICAL RECORD: ICD-10-PCS | Mod: CPTII,,, | Performed by: NURSE PRACTITIONER

## 2023-03-23 PROCEDURE — 83735 ASSAY OF MAGNESIUM: CPT | Performed by: NURSE PRACTITIONER

## 2023-03-23 PROCEDURE — 80048 BASIC METABOLIC PNL TOTAL CA: CPT | Performed by: NURSE PRACTITIONER

## 2023-03-23 PROCEDURE — 84443 ASSAY THYROID STIM HORMONE: CPT | Performed by: NURSE PRACTITIONER

## 2023-03-23 PROCEDURE — 3075F SYST BP GE 130 - 139MM HG: CPT | Mod: CPTII,,, | Performed by: NURSE PRACTITIONER

## 2023-03-23 PROCEDURE — 3075F PR MOST RECENT SYSTOLIC BLOOD PRESS GE 130-139MM HG: ICD-10-PCS | Mod: CPTII,,, | Performed by: NURSE PRACTITIONER

## 2023-03-23 PROCEDURE — 1160F PR REVIEW ALL MEDS BY PRESCRIBER/CLIN PHARMACIST DOCUMENTED: ICD-10-PCS | Mod: CPTII,,, | Performed by: NURSE PRACTITIONER

## 2023-03-23 PROCEDURE — 84439 ASSAY OF FREE THYROXINE: CPT | Performed by: NURSE PRACTITIONER

## 2023-03-23 PROCEDURE — 3079F DIAST BP 80-89 MM HG: CPT | Mod: CPTII,,, | Performed by: NURSE PRACTITIONER

## 2023-03-23 RX ORDER — DOXYCYCLINE 100 MG/1
100 CAPSULE ORAL EVERY 12 HOURS
Qty: 20 CAPSULE | Refills: 0 | Status: SHIPPED | OUTPATIENT
Start: 2023-03-23 | End: 2023-04-02

## 2023-03-23 NOTE — PROGRESS NOTES
Patient ID: Bethanie Umana  : 1985     Chief Complaint: sores on head and organ spasms    Allergies: Patient is allergic to latex, penicillin, sulfa (sulfonamide antibiotics), sulfur, and adhesive tape-silicones.     History of Present Illness:  The patient is a 37 y.o. White female who presents to clinic for evaluation and management with a chief complaint of sores on head and organ spasms   Spasms  This is a recurrent problem. The current episode started more than 1 month ago. The problem occurs intermittently. The problem has been waxing and waning. Associated symptoms include arthralgias, myalgias and a rash. Pertinent negatives include no abdominal pain, anorexia, change in bowel habit, chest pain, chills, congestion, coughing, diaphoresis, fatigue, fever, headaches, joint swelling, nausea, neck pain, numbness, sore throat, swollen glands, urinary symptoms, vertigo, visual change, vomiting or weakness. Associated symptoms comments: Spasms occur all over the body, not at same time, occur for less than a minute, no triggers identified . Nothing aggravates the symptoms. She has tried acetaminophen, NSAIDs, position changes and rest for the symptoms.   Rash  The current episode started in the past 7 days. The problem has been gradually worsening since onset. The affected locations include the scalp. The rash is characterized by dryness, itchiness, redness and scaling. Pertinent negatives include no anorexia, congestion, cough, diarrhea, eye pain, fatigue, fever, sore throat or vomiting.      Past Medical History:  has a past medical history of Anxiety, Breast pain, Cervicalgia, Fibromyalgia, Frequent headaches, History of pulmonary embolism, Hypothyroidism, Iron deficiency anemia, Major depression, recurrent, Multiple thyroid nodules, Nerve damage, Polyarthritis, and PTSD (post-traumatic stress disorder).    Social History:  reports that she has never smoked. She has never been exposed to tobacco smoke.  "She has never used smokeless tobacco. She reports that she does not currently use alcohol. She reports that she does not use drugs.    Care Team: Patient Care Team:  ANASTASIYA Stern as PCP - General (Family Medicine)  Deena Landa MD (Neurology)     Current Medications:  Current Outpatient Medications   Medication Instructions    ADDERALL XR 30 mg 24 hr capsule 1 capsule, Oral, Every morning    busPIRone (BUSPAR) 7.5 mg, Oral, Daily    doxycycline (VIBRAMYCIN) 100 mg, Oral, Every 12 hours    gabapentin (NEURONTIN) 100 MG capsule 1 capsule, Oral, 2 times daily    gabapentin (NEURONTIN) 400 mg, Oral, Nightly    levothyroxine (SYNTHROID) 50 mcg, Oral, Daily    WELLBUTRIN  mg, Oral, Every morning       Review of Systems   Constitutional:  Negative for chills, diaphoresis, fatigue and fever.   HENT:  Negative for nasal congestion and sore throat.    Eyes:  Negative for pain.   Respiratory:  Negative for cough.    Cardiovascular:  Negative for chest pain.   Gastrointestinal:  Negative for abdominal pain, anorexia, change in bowel habit, diarrhea, nausea, vomiting and change in bowel habit.   Musculoskeletal:  Positive for arthralgias and myalgias. Negative for joint swelling and neck pain.   Integumentary:  Positive for rash.   Neurological:  Negative for vertigo, weakness, numbness and headaches.   Psychiatric/Behavioral:  The patient is nervous/anxious.       Visit Vitals  /80   Pulse 90   Temp 97.2 °F (36.2 °C)   Ht 5' 4.06" (1.627 m)   Wt 67.7 kg (149 lb 3.2 oz)   LMP 02/11/2023 (Exact Date)   SpO2 99%   BMI 25.57 kg/m²       Physical Exam  Vitals reviewed.   Constitutional:       General: She is not in acute distress.     Appearance: Normal appearance.   HENT:      Head: Normocephalic and atraumatic.      Right Ear: Tympanic membrane, ear canal and external ear normal.      Left Ear: Tympanic membrane, ear canal and external ear normal.      Nose: Nose normal. No congestion.      " Mouth/Throat:      Mouth: Mucous membranes are moist.      Pharynx: Oropharynx is clear. No oropharyngeal exudate or posterior oropharyngeal erythema.   Eyes:      Extraocular Movements: Extraocular movements intact.      Conjunctiva/sclera: Conjunctivae normal.      Pupils: Pupils are equal, round, and reactive to light.   Pulmonary:      Effort: Pulmonary effort is normal.   Abdominal:      Palpations: Abdomen is soft.      Tenderness: There is no abdominal tenderness.   Musculoskeletal:         General: No swelling, tenderness or deformity.      Cervical back: Neck supple.      Comments: No visible muscle fasciculations   Lymphadenopathy:      Cervical: No cervical adenopathy.   Skin:     General: Skin is warm and dry.      Coloration: Skin is not jaundiced.      Findings: Rash (few erythematous pustules to scalp, few with honey crusting, no drainage) present.   Neurological:      Mental Status: She is alert and oriented to person, place, and time.   Psychiatric:         Mood and Affect: Mood normal.         Behavior: Behavior normal.      Assessment & Plan:  1. Folliculitis  -     doxycycline (VIBRAMYCIN) 100 MG Cap; Take 1 capsule (100 mg total) by mouth every 12 (twelve) hours. for 10 days  Dispense: 20 capsule; Refill: 0    2. Muscle spasms of both lower extremities  Comments:  discussed possible causes including fibromyalgia, electrolyte imbalances, anxiety. will notify of lab results when available.   Orders:  -     Basic Metabolic Panel; Future; Expected date: 03/23/2023  -     Magnesium; Future; Expected date: 04/20/2023    3. Hypothyroidism, unspecified type  -     TSH; Future; Expected date: 03/23/2023  -     T4, Free; Future; Expected date: 03/23/2023    4. Fibromyalgia         Future Appointments   Date Time Provider Department Center   3/29/2023  9:30 AM LAB, [facility] LABORATORY DRAW STATION [facility] JAZ Horner [facility]   3/30/2023  1:40 PM RHEUMATOLOGY ACCESS CLINIC [facility]   5/31/2023  8:20 AM LAB,  [facility] LABORATORY DRAW STATION [facility] JAZ Caro   6/6/2023  1:30 PM ANASTASIYA Stern [facility] NEGRITO Horner [facility]       Follow up if symptoms worsen or fail to improve, for Keep Apt as Scheduled. Call sooner if needed.    SAL PATRICK    Lab Frequency Next Occurrence   Ambulatory referral/consult to Gastroenterology Once 07/13/2022   Ambulatory referral/consult to Neurology Once 03/01/2023   Ambulatory referral/consult to Rheumatology Once 03/01/2023   Basic Metabolic Panel Once 03/23/2023   Magnesium Once 04/20/2023

## 2023-03-27 ENCOUNTER — TELEPHONE (OUTPATIENT)
Dept: FAMILY MEDICINE | Facility: CLINIC | Age: 38
End: 2023-03-27
Payer: MEDICAID

## 2023-03-27 NOTE — TELEPHONE ENCOUNTER
----- Message from Jillian Tapia sent at 3/27/2023  9:06 AM CDT -----  Patient received her Lab results via [facility] and she states that they are a bit alarming and would like to speak to someone asap to review them. Please call back     Bethanie Umana   [phone]

## 2023-03-27 NOTE — TELEPHONE ENCOUNTER
I left a voicemail for pt stating all labs were normal per Cheyanne, I stated if she needed anything else to call back

## 2023-03-27 NOTE — TELEPHONE ENCOUNTER
I do not see anything alarming in her labs.  Her thyroid levels are normal.  Her electrolytes are also normal.  Specifically, what is she concerned about?

## 2023-03-29 ENCOUNTER — TELEPHONE (OUTPATIENT)
Dept: FAMILY MEDICINE | Facility: CLINIC | Age: 38
End: 2023-03-29
Payer: MEDICAID

## 2023-03-29 NOTE — TELEPHONE ENCOUNTER
Last office note was faxed to Loretta at [phone] [facility] for them to issue a PA for pt to travel to [place] for Rheumatology appt. I received the confirmation that it went through.

## 2023-04-15 ENCOUNTER — HOSPITAL ENCOUNTER (EMERGENCY)
Facility: HOSPITAL | Age: 38
Discharge: HOME | End: 2023-04-15
Attending: OBSTETRICS & GYNECOLOGY
Payer: MEDICAID

## 2023-04-15 VITALS
TEMPERATURE: 98 F | HEART RATE: 87 BPM | BODY MASS INDEX: 25.61 KG/M2 | SYSTOLIC BLOOD PRESSURE: 128 MMHG | DIASTOLIC BLOOD PRESSURE: 88 MMHG | WEIGHT: 150 LBS | RESPIRATION RATE: 20 BRPM | HEIGHT: 64 IN | OXYGEN SATURATION: 98 %

## 2023-04-15 DIAGNOSIS — R10.9 ABDOMINAL PAIN, ACUTE: Primary | ICD-10-CM

## 2023-04-15 LAB
ALBUMIN SERPL-MCNC: 4.6 G/DL (ref 3.4–5)
ALBUMIN/GLOB SERPL: 1.6 RATIO
ALP SERPL-CCNC: 56 UNIT/L (ref 50–144)
ALT SERPL-CCNC: 42 UNIT/L (ref 1–45)
ANION GAP SERPL CALC-SCNC: 5 MEQ/L (ref 2–13)
APPEARANCE UR: CLEAR
AST SERPL-CCNC: 43 UNIT/L (ref 14–36)
B-HCG SERPL QL: NEGATIVE
BASOPHILS # BLD AUTO: 0.04 X10(3)/MCL (ref 0.01–0.08)
BASOPHILS NFR BLD AUTO: 0.4 % (ref 0.1–1.2)
BILIRUB UR QL STRIP.AUTO: NEGATIVE MG/DL
BILIRUBIN DIRECT+TOT PNL SERPL-MCNC: 0.6 MG/DL (ref 0–1)
BUN SERPL-MCNC: 15 MG/DL (ref 7–20)
CALCIUM SERPL-MCNC: 9.1 MG/DL (ref 8.4–10.2)
CHLORIDE SERPL-SCNC: 104 MMOL/L (ref 98–110)
CO2 SERPL-SCNC: 30 MMOL/L (ref 21–32)
COLOR UR AUTO: YELLOW
CREAT SERPL-MCNC: 0.68 MG/DL (ref 0.66–1.25)
CREAT/UREA NIT SERPL: 22 (ref 12–20)
EOSINOPHIL # BLD AUTO: 0.19 X10(3)/MCL (ref 0.04–0.36)
EOSINOPHIL NFR BLD AUTO: 2 % (ref 0.7–7)
ERYTHROCYTE [DISTWIDTH] IN BLOOD BY AUTOMATED COUNT: 12.9 % (ref 11–14.5)
GFR SERPLBLD CREATININE-BSD FMLA CKD-EPI: >90 MLS/MIN/1.73/M2
GLOBULIN SER-MCNC: 2.9 GM/DL (ref 2–3.9)
GLUCOSE SERPL-MCNC: 93 MG/DL (ref 70–115)
GLUCOSE UR QL STRIP.AUTO: NEGATIVE MG/DL
HCT VFR BLD AUTO: 41.8 % (ref 36–48)
HGB BLD-MCNC: 13.4 G/DL (ref 11.8–16)
IMM GRANULOCYTES # BLD AUTO: 0.02 X10(3)/MCL (ref 0–0.03)
IMM GRANULOCYTES NFR BLD AUTO: 0.2 % (ref 0–0.5)
KETONES UR QL STRIP.AUTO: NEGATIVE MG/DL
LEUKOCYTE ESTERASE UR QL STRIP.AUTO: NEGATIVE UNIT/L
LIPASE SERPL-CCNC: 60 U/L (ref 23–300)
LYMPHOCYTES # BLD AUTO: 2.12 X10(3)/MCL (ref 1.16–3.74)
LYMPHOCYTES NFR BLD AUTO: 21.8 % (ref 20–55)
MCH RBC QN AUTO: 28.5 PG (ref 27–34)
MCV RBC AUTO: 88.7 FL (ref 79–99)
MEAN CELL HEMOGLOBIN CONCENTRATION (OHS) G/DL: 32.1 G/DL (ref 31–37)
MONOCYTES # BLD AUTO: 0.7 X10(3)/MCL (ref 0.24–0.36)
MONOCYTES NFR BLD AUTO: 7.2 % (ref 4.7–12.5)
NEUTROPHILS # BLD AUTO: 6.65 X10(3)/MCL (ref 1.56–6.13)
NEUTROPHILS NFR BLD AUTO: 68.4 % (ref 37–73)
NITRITE UR QL STRIP.AUTO: NEGATIVE
NRBC BLD AUTO-RTO: 0 % (ref 0–1)
PH UR STRIP.AUTO: 7 [PH]
PLATELET # BLD AUTO: 206 X10(3)/MCL (ref 140–371)
PMV BLD AUTO: 9.4 FL (ref 9.4–12.4)
POTASSIUM SERPL-SCNC: 4.3 MMOL/L (ref 3.5–5.1)
PROT SERPL-MCNC: 7.5 GM/DL (ref 6.3–8.2)
PROT UR QL STRIP.AUTO: NEGATIVE MG/DL
RBC # BLD AUTO: 4.71 X10(6)/MCL (ref 4–5.1)
RBC UR QL AUTO: NEGATIVE UNIT/L
SODIUM SERPL-SCNC: 139 MMOL/L (ref 135–145)
SP GR UR STRIP.AUTO: 1.01
UROBILINOGEN UR STRIP-ACNC: 0.2 MG/DL
WBC # SPEC AUTO: 9.7 X10(3)/MCL (ref 4–11.5)

## 2023-04-15 PROCEDURE — 99284 EMERGENCY DEPT VISIT MOD MDM: CPT

## 2023-04-15 PROCEDURE — 81003 URINALYSIS AUTO W/O SCOPE: CPT | Performed by: OBSTETRICS & GYNECOLOGY

## 2023-04-15 PROCEDURE — 36415 COLL VENOUS BLD VENIPUNCTURE: CPT | Performed by: OBSTETRICS & GYNECOLOGY

## 2023-04-15 PROCEDURE — 83690 ASSAY OF LIPASE: CPT | Performed by: OBSTETRICS & GYNECOLOGY

## 2023-04-15 PROCEDURE — 80053 COMPREHEN METABOLIC PANEL: CPT | Performed by: OBSTETRICS & GYNECOLOGY

## 2023-04-15 PROCEDURE — 81025 URINE PREGNANCY TEST: CPT | Performed by: OBSTETRICS & GYNECOLOGY

## 2023-04-15 PROCEDURE — 85025 COMPLETE CBC W/AUTO DIFF WBC: CPT | Performed by: OBSTETRICS & GYNECOLOGY

## 2023-04-15 RX ORDER — DICYCLOMINE HYDROCHLORIDE 10 MG/1
10 CAPSULE ORAL
Qty: 30 CAPSULE | Refills: 0 | Status: SHIPPED | OUTPATIENT
Start: 2023-04-15 | End: 2023-04-24

## 2023-04-15 RX ORDER — ONDANSETRON 4 MG/1
4 TABLET, FILM COATED ORAL EVERY 6 HOURS
Qty: 12 TABLET | Refills: 0 | Status: SHIPPED | OUTPATIENT
Start: 2023-04-15

## 2023-04-15 NOTE — Clinical Note
"Bethanie Duenas" Dong was seen and treated in our emergency department on 4/15/2023.  She may return to work on 04/18/2023.       If you have any questions or concerns, please don't hesitate to call.      Radha Irving MD"

## 2023-04-15 NOTE — ED PROVIDER NOTES
Encounter Date: 4/15/2023       History     Chief Complaint   Patient presents with    Abdominal Pain     C/O SEVERE ABDOMINAL PAIN SINCE THIS MORNING WITH VOMITING AND BLOOD IN STOOL     37-YEAR-OLD FEMALE COMPLAINING OF UPSET STOMACH AND PASSING BLOOD IN THE STOOLS SINCE LAST 4 HOURS PAST MEDICAL HISTORY OF ANXIETY DEPRESSION DENIES ANY NAUSEA VOMITING CAME FOR EVALUATION IN ER      Review of patient's allergies indicates:   Allergen Reactions    Latex Rash and Swelling    Penicillin     Sulfa (sulfonamide antibiotics)      Other reaction(s): To syndrome    Sulfur Other (See Comments)    Adhesive tape-silicones Rash     Past Medical History:   Diagnosis Date    Anxiety     Breast pain     Cervicalgia     Fibromyalgia     Frequent headaches     History of pulmonary embolism     Hypothyroidism     Iron deficiency anemia     Major depression, recurrent     Multiple thyroid nodules     Nerve damage     Polyarthritis     PTSD (post-traumatic stress disorder)      Past Surgical History:   Procedure Laterality Date    ABDOMINOPLASTY  2017    AUGMENTATION OF BREAST  2017    CHOLECYSTECTOMY  2017     Family History   Problem Relation Age of Onset    Breast cancer Maternal Aunt     Breast cancer Maternal Grandmother      Social History     Tobacco Use    Smoking status: Never     Passive exposure: Never    Smokeless tobacco: Never   Substance Use Topics    Alcohol use: Not Currently    Drug use: Never     Review of Systems   All other systems reviewed and are negative.    Physical Exam     Initial Vitals [04/15/23 1454]   BP Pulse Resp Temp SpO2   (!) 151/83 87 18 98 °F (36.7 °C) 100 %      MAP       --         Physical Exam    Constitutional: She appears well-developed and well-nourished.   Eyes: EOM are normal. Pupils are equal, round, and reactive to light.   Neck: Neck supple.   Normal range of motion.  Cardiovascular:  Normal rate.           Pulmonary/Chest: Breath sounds normal.   Abdominal: Abdomen is soft.    Musculoskeletal:      Cervical back: Normal range of motion and neck supple.     Neurological: She is alert and oriented to person, place, and time.   Skin: Skin is warm.       ED Course   Procedures  Labs Reviewed   COMPREHENSIVE METABOLIC PANEL - Abnormal; Notable for the following components:       Result Value    Aspartate Aminotransferase 43 (*)     BUN/Creatinine Ratio 22 (*)     All other components within normal limits   CBC WITH DIFFERENTIAL - Abnormal; Notable for the following components:    Neut # 6.65 (*)     Mono # 0.70 (*)     All other components within normal limits   HCG QUALITATIVE URINE - Normal   LIPASE - Normal   URINALYSIS    Narrative:      URINE STABILITY IS 2 HOURS AT ROOM TEMP OR    SIX HOURS REFRIGERATED. PERFORMING TESTING ON    SPECIMENS GREATER THAN THIS AGE MAY AFFECT THE    FOLLOWING TESTS:    PH          SPECIFIC GRAVITY           BLOOD    CLARITY     BILIRUBIN               UROBILINOGEN   CBC W/ AUTO DIFFERENTIAL    Narrative:     The following orders were created for panel order CBC auto differential.  Procedure                               Abnormality         Status                     ---------                               -----------         ------                     CBC with Differential[[id number]]        Abnormal            Final result                 Please view results for these tests on the individual orders.   OCCULT BLOOD,STOOL,SCREEN (1-3)    Narrative:     The following orders were created for panel order Occult Blood, Stool Screening (1 -3).  Procedure                               Abnormality         Status                     ---------                               -----------         ------                     Occult Blood Stool, CA S...[[id number]]                                                   Please view results for these tests on the individual orders.   OCCULT BLOOD STOOL, CA SCREEN          Imaging Results    None          Medications - No data to  display  Medical Decision Making:   Initial Assessment:   DYSPEPSIA  GASTRITIS  GERD  HEMORRHOIDS  BLOOD IN STOOL  Differential Diagnosis:   LABS REVIEWED WITH THE PATIENT ORDER FOR THE OUTPATIENT STOOL EXAM IS GIVEN AS PATIENT IS UNABLE TO PRODUCE STOOL IN THE ER DC HOME WITH BENTYL AND ZOFRAN FOLLOW-UP WITH PRIMARY CARE DOCTOR  Clinical Tests:   Lab Tests: Ordered  Medical Tests: Ordered                        Clinical Impression:   Final diagnoses:  [R10.9] Abdominal pain, acute (Primary)        ED Disposition Condition    Discharge Stable          ED Prescriptions       Medication Sig Dispense Start Date End Date Auth. Provider    dicyclomine (BENTYL) 10 MG capsule Take 1 capsule (10 mg total) by mouth 4 (four) times daily before meals and nightly. for 30 doses 30 capsule 4/15/2023 4/23/2023 Radha Irving MD    ondansetron (ZOFRAN) 4 MG tablet Take 1 tablet (4 mg total) by mouth every 6 (six) hours. 12 tablet 4/15/2023 -- Radha Irving MD          Follow-up Information       Follow up With Specialties Details Why Contact Info    [facility] Schedule an appointment as soon as possible for a visit in 2 days If symptoms worsen [address] [place] [id number]  [phone]               Radha Irving MD  04/15/23 8007

## 2023-04-18 ENCOUNTER — LAB VISIT (OUTPATIENT)
Dept: LAB | Facility: HOSPITAL | Age: 38
End: 2023-04-18
Attending: NURSE PRACTITIONER
Payer: MEDICAID

## 2023-04-18 DIAGNOSIS — K83.8 COMMON BILE DUCT DILATION: ICD-10-CM

## 2023-04-18 DIAGNOSIS — Z79.899 ENCOUNTER FOR LONG-TERM (CURRENT) USE OF OTHER MEDICATIONS: Primary | ICD-10-CM

## 2023-04-18 LAB
ALBUMIN SERPL-MCNC: 4.3 G/DL (ref 3.4–5)
ALBUMIN/GLOB SERPL: 1.5 RATIO
ALP SERPL-CCNC: 58 UNIT/L (ref 50–144)
ALT SERPL-CCNC: 31 UNIT/L (ref 1–45)
ANION GAP SERPL CALC-SCNC: 5 MEQ/L (ref 2–13)
AST SERPL-CCNC: 41 UNIT/L (ref 14–36)
BASOPHILS # BLD AUTO: 0.04 X10(3)/MCL (ref 0.01–0.08)
BASOPHILS NFR BLD AUTO: 0.6 % (ref 0.1–1.2)
BILIRUBIN DIRECT+TOT PNL SERPL-MCNC: 0.6 MG/DL (ref 0–1)
BUN SERPL-MCNC: 13 MG/DL (ref 7–20)
CALCIUM SERPL-MCNC: 9.6 MG/DL (ref 8.4–10.2)
CHLORIDE SERPL-SCNC: 107 MMOL/L (ref 98–110)
CHOLEST SERPL-MCNC: 157 MG/DL (ref 0–200)
CO2 SERPL-SCNC: 28 MMOL/L (ref 21–32)
CREAT SERPL-MCNC: 0.68 MG/DL (ref 0.66–1.25)
CREAT/UREA NIT SERPL: 19 (ref 12–20)
DEPRECATED CALCIDIOL+CALCIFEROL SERPL-MC: 48.8 NG/ML (ref 30–100)
EOSINOPHIL # BLD AUTO: 0.21 X10(3)/MCL (ref 0.04–0.36)
EOSINOPHIL NFR BLD AUTO: 3.1 % (ref 0.7–7)
ERYTHROCYTE [DISTWIDTH] IN BLOOD BY AUTOMATED COUNT: 12.6 % (ref 11–14.5)
GFR SERPLBLD CREATININE-BSD FMLA CKD-EPI: >90 MLS/MIN/1.73/M2
GLOBULIN SER-MCNC: 2.8 GM/DL (ref 2–3.9)
GLUCOSE SERPL-MCNC: 87 MG/DL (ref 70–115)
HCT VFR BLD AUTO: 38.5 % (ref 36–48)
HDLC SERPL-MCNC: 75 MG/DL (ref 40–60)
HGB BLD-MCNC: 12.5 G/DL (ref 11.8–16)
IMM GRANULOCYTES # BLD AUTO: 0.01 X10(3)/MCL (ref 0–0.03)
IMM GRANULOCYTES NFR BLD AUTO: 0.1 % (ref 0–0.5)
LDLC SERPL DIRECT ASSAY-SCNC: 54.9 MG/DL (ref 30–100)
LYMPHOCYTES # BLD AUTO: 2.21 X10(3)/MCL (ref 1.16–3.74)
LYMPHOCYTES NFR BLD AUTO: 32.3 % (ref 20–55)
MAGNESIUM SERPL-MCNC: 2 MG/DL (ref 1.8–2.4)
MCH RBC QN AUTO: 28.9 PG (ref 27–34)
MCV RBC AUTO: 88.9 FL (ref 79–99)
MEAN CELL HEMOGLOBIN CONCENTRATION (OHS) G/DL: 32.5 G/DL (ref 31–37)
MONOCYTES # BLD AUTO: 0.65 X10(3)/MCL (ref 0.24–0.36)
MONOCYTES NFR BLD AUTO: 9.5 % (ref 4.7–12.5)
NEUTROPHILS # BLD AUTO: 3.72 X10(3)/MCL (ref 1.56–6.13)
NEUTROPHILS NFR BLD AUTO: 54.4 % (ref 37–73)
NRBC BLD AUTO-RTO: 0 % (ref 0–1)
PLATELET # BLD AUTO: 208 X10(3)/MCL (ref 140–371)
PMV BLD AUTO: 9.6 FL (ref 9.4–12.4)
POTASSIUM SERPL-SCNC: 4.3 MMOL/L (ref 3.5–5.1)
PROT SERPL-MCNC: 7.1 GM/DL (ref 6.3–8.2)
RBC # BLD AUTO: 4.33 X10(6)/MCL (ref 4–5.1)
SODIUM SERPL-SCNC: 140 MMOL/L (ref 135–145)
TRIGL SERPL-MCNC: 52 MG/DL (ref 30–200)
TSH SERPL-ACNC: 2.64 UIU/ML (ref 0.36–3.74)
WBC # SPEC AUTO: 6.8 X10(3)/MCL (ref 4–11.5)

## 2023-04-18 PROCEDURE — 80061 LIPID PANEL: CPT

## 2023-04-18 PROCEDURE — 80053 COMPREHEN METABOLIC PANEL: CPT

## 2023-04-18 PROCEDURE — 85025 COMPLETE CBC W/AUTO DIFF WBC: CPT

## 2023-04-18 PROCEDURE — 36415 COLL VENOUS BLD VENIPUNCTURE: CPT

## 2023-04-18 PROCEDURE — 83735 ASSAY OF MAGNESIUM: CPT

## 2023-04-18 PROCEDURE — 82306 VITAMIN D 25 HYDROXY: CPT

## 2023-04-18 PROCEDURE — 82270 OCCULT BLOOD FECES: CPT

## 2023-04-18 PROCEDURE — 84443 ASSAY THYROID STIM HORMONE: CPT

## 2023-04-19 LAB
COLOR STL: ABNORMAL
CONSISTENCY STL: ABNORMAL
HEMOCCULT SP1 STL QL: POSITIVE

## 2023-04-24 ENCOUNTER — OFFICE VISIT (OUTPATIENT)
Dept: FAMILY MEDICINE | Facility: CLINIC | Age: 38
End: 2023-04-24
Payer: MEDICAID

## 2023-04-24 VITALS
OXYGEN SATURATION: 96 % | TEMPERATURE: 98 F | BODY MASS INDEX: 26.63 KG/M2 | HEIGHT: 64 IN | WEIGHT: 156 LBS | SYSTOLIC BLOOD PRESSURE: 118 MMHG | DIASTOLIC BLOOD PRESSURE: 72 MMHG | HEART RATE: 81 BPM

## 2023-04-24 DIAGNOSIS — F33.1 MODERATE EPISODE OF RECURRENT MAJOR DEPRESSIVE DISORDER: ICD-10-CM

## 2023-04-24 DIAGNOSIS — K44.9 HIATAL HERNIA: ICD-10-CM

## 2023-04-24 DIAGNOSIS — M79.642 BILATERAL HAND PAIN: ICD-10-CM

## 2023-04-24 DIAGNOSIS — R19.5 OCCULT BLOOD POSITIVE STOOL: ICD-10-CM

## 2023-04-24 DIAGNOSIS — R10.9 ABDOMINAL PAIN, UNSPECIFIED ABDOMINAL LOCATION: Primary | ICD-10-CM

## 2023-04-24 DIAGNOSIS — M62.830 MUSCLE SPASM OF BACK: ICD-10-CM

## 2023-04-24 DIAGNOSIS — M79.641 BILATERAL HAND PAIN: ICD-10-CM

## 2023-04-24 PROCEDURE — 99214 OFFICE O/P EST MOD 30 MIN: CPT | Mod: ,,, | Performed by: NURSE PRACTITIONER

## 2023-04-24 PROCEDURE — 1160F PR REVIEW ALL MEDS BY PRESCRIBER/CLIN PHARMACIST DOCUMENTED: ICD-10-PCS | Mod: CPTII,,, | Performed by: NURSE PRACTITIONER

## 2023-04-24 PROCEDURE — 1160F RVW MEDS BY RX/DR IN RCRD: CPT | Mod: CPTII,,, | Performed by: NURSE PRACTITIONER

## 2023-04-24 PROCEDURE — 3078F PR MOST RECENT DIASTOLIC BLOOD PRESSURE < 80 MM HG: ICD-10-PCS | Mod: CPTII,,, | Performed by: NURSE PRACTITIONER

## 2023-04-24 PROCEDURE — 3008F PR BODY MASS INDEX (BMI) DOCUMENTED: ICD-10-PCS | Mod: CPTII,,, | Performed by: NURSE PRACTITIONER

## 2023-04-24 PROCEDURE — 1159F MED LIST DOCD IN RCRD: CPT | Mod: CPTII,,, | Performed by: NURSE PRACTITIONER

## 2023-04-24 PROCEDURE — 3074F PR MOST RECENT SYSTOLIC BLOOD PRESSURE < 130 MM HG: ICD-10-PCS | Mod: CPTII,,, | Performed by: NURSE PRACTITIONER

## 2023-04-24 PROCEDURE — 99214 PR OFFICE/OUTPT VISIT, EST, LEVL IV, 30-39 MIN: ICD-10-PCS | Mod: ,,, | Performed by: NURSE PRACTITIONER

## 2023-04-24 PROCEDURE — 3074F SYST BP LT 130 MM HG: CPT | Mod: CPTII,,, | Performed by: NURSE PRACTITIONER

## 2023-04-24 PROCEDURE — 3008F BODY MASS INDEX DOCD: CPT | Mod: CPTII,,, | Performed by: NURSE PRACTITIONER

## 2023-04-24 PROCEDURE — 1159F PR MEDICATION LIST DOCUMENTED IN MEDICAL RECORD: ICD-10-PCS | Mod: CPTII,,, | Performed by: NURSE PRACTITIONER

## 2023-04-24 PROCEDURE — 3078F DIAST BP <80 MM HG: CPT | Mod: CPTII,,, | Performed by: NURSE PRACTITIONER

## 2023-04-24 RX ORDER — PROPRANOLOL HYDROCHLORIDE 10 MG/1
10 TABLET ORAL 2 TIMES DAILY
COMMUNITY
Start: 2023-04-03

## 2023-04-24 RX ORDER — CYCLOBENZAPRINE HCL 5 MG
5 TABLET ORAL 3 TIMES DAILY PRN
Qty: 15 TABLET | Refills: 0 | Status: SHIPPED | OUTPATIENT
Start: 2023-04-24 | End: 2023-04-29

## 2023-04-24 NOTE — PROGRESS NOTES
Patient ID: Bethanie Umana is a 37 y.o. female.    Chief Complaint: Abdominal Pain and Rectal Bleeding                     History of Present Illness:  The patient is 37 y.o. White female for evaluation and management with a chief complaint of Abdominal Pain and Rectal Bleeding  with ER visit 4/15/23.  She describes a crampy type abdominal pain that was most pronounced to right upper quadrant and left lower quadrant of abdomen.  She has also experienced several episodes of bright red blood per rectum.  She is noticed some blood in the toilet and blood-streaked, formed stool over the past few months (worsened over past few weeks). Stool positive for occult blood and received prescription for dicyclomine which seems to improve her abdominal pain.  Nausea improved with ondansetron.   Symptoms are unaffected by meals.  She has been using terazosin time and probiotics daily.  Most recent EGD and colonoscopy in 2019 in [place] that revealed hiatal hernia and normal colon for similar symptoms.  Denies family history of IBD or colon cancer.  No vomiting.  Appetite remains normal.  8 lb weight gain over the past month. Normal formed stools daily without straining.  Reports history of hemorrhoid 1st experienced about 8 years ago after childbirth and occasionally causes issues with constipation.  No current pain with defecation.     Requesting referral be sent to Dr. Landa for Bilateral hand pain.  She experiences pain to all joints of both hands and a shooting sensation into her wrist.  She also complains of tingling to 1st 3 digits of each hand.  She has an upcoming appointment to discuss her headaches but would like to discuss hand pain related to carpal tunnel.  Had initial visit with Rheumatology for positive SHAHZAD and continue joint pain.  Several labs were drawn and she is awaiting results and follow-up in the next month.  She continues to participate in physical therapy but has not found it improves her pain.   "No improvement with oral steroids or NSAIDs.    She also complains of stiffness to muscles of mid back.  Continues to participate in physical therapy but minimal improvement.  Muscle spasms are interfering with sleep.    Currently followed by Marilyn Daniels for psychiatry but would like referral to in office psychiatrist for anxiety, depression, ADHD, and PTSD.      Labs/visit ER visit note reviewed:  White blood cell count 6.8, hemoglobin 12.5, hematocrit 38.5, platelet count 208  AST 41, ALT 31, BUN 13, creatinine 0.68  UPT negative      ROS: See HPI    Past Medical History:  has a past medical history of Anxiety, Breast pain, Cervicalgia, Fibromyalgia, Frequent headaches, History of pulmonary embolism, Hypothyroidism, Iron deficiency anemia, Major depression, recurrent, Multiple thyroid nodules, Nerve damage, Polyarthritis, and PTSD (post-traumatic stress disorder).    Past Surgical History:   Procedure Laterality Date    ABDOMINOPLASTY  2017    AUGMENTATION OF BREAST  2017    CHOLECYSTECTOMY  2017        Current Outpatient Medications   Medication Instructions    ADDERALL XR 30 mg 24 hr capsule 1 capsule, Oral, Every morning    busPIRone (BUSPAR) 7.5 mg, Oral, Daily    cyclobenzaprine (FLEXERIL) 5 mg, Oral, 3 times daily PRN    gabapentin (NEURONTIN) 100 MG capsule 1 capsule, Oral, 2 times daily    gabapentin (NEURONTIN) 400 mg, Oral, Nightly    levothyroxine (SYNTHROID) 50 mcg, Oral, Daily    ondansetron (ZOFRAN) 4 mg, Oral, Every 6 hours    propranoloL (INDERAL) 10 mg, Oral, 2 times daily    WELLBUTRIN  mg, Oral, Every morning       Patient is allergic to latex, penicillin, sulfa (sulfonamide antibiotics), sulfur, and adhesive tape-silicones.       Visit Vitals  /72   Pulse 81   Temp 98.1 °F (36.7 °C) (Temporal)   Ht 5' 4" (1.626 m)   Wt 70.8 kg (156 lb)   LMP 04/21/2023   SpO2 96%   BMI 26.78 kg/m²         Physical Exam  Vitals reviewed.   Constitutional:       Appearance: Normal appearance. "   Cardiovascular:      Rate and Rhythm: Normal rate and regular rhythm.      Heart sounds: Normal heart sounds.   Pulmonary:      Effort: Pulmonary effort is normal.      Breath sounds: Normal breath sounds.   Abdominal:      General: Bowel sounds are normal.      Palpations: Abdomen is soft.      Tenderness: There is abdominal tenderness (To right upper quadrant and left lower quadrant).   Musculoskeletal:      Comments: Tenderness to all joints bilateral hands without visible edema or erythema   Pain to muscles surrounding thoracic spine bilaterally   Skin:     General: Skin is warm and dry.   Neurological:      Mental Status: She is alert.      Comments: Hand  equal and strong           Assessment/Plan:    ICD-10-CM ICD-9-CM   1. Abdominal pain, unspecified abdominal location  R10.9 789.00   2. Occult blood positive stool  R19.5 792.1   3. Muscle spasm of back  M62.830 724.8   4. Bilateral hand pain  M79.641 729.5    M79.642    5. Moderate episode of recurrent major depressive disorder  F33.1 296.32       1. Abdominal pain, unspecified abdominal location  - Ambulatory referral/consult to General Surgery; Future  ER precautions given     2. Occult blood positive stool  Avoid NSAIDs  Call office/go to ER with any worsening bleeding  - Ambulatory referral/consult to General Surgery; Future    3. Hiatal hernia    4. Muscle spasm of back  Continue PT   Add cyclobenzaprine 5 mg as needed 3 times per day  - cyclobenzaprine (FLEXERIL) 5 MG tablet; Take 1 tablet (5 mg total) by mouth 3 (three) times daily as needed for Muscle spasms.  Dispense: 15 tablet; Refill: 0    5. Bilateral hand pain  EMG reviewed  Keep scheduled appointment with neurology; referral sent     6. Moderate episode of recurrent major depressive disorder  Continue all current psychiatric medications including Adderall, buspirone, Wellbutrin, and gabapentin  Refer to Adolfo Cerda, psych NP       Keep scheduled f/u or RTC sooner as needed      Future Appointments   Date Time Provider Department Center   5/31/2023  8:20 AM LAB, [facility] DRAW [facility]ATION JOANNA Caro   6/6/2023  1:30 PM [facility]   7/13/2023 11:00 AM RHEUMATOLOGY FELLOW 1 [facility]         ANASTASIYA Stern

## 2023-05-08 ENCOUNTER — TELEPHONE (OUTPATIENT)
Dept: FAMILY MEDICINE | Facility: CLINIC | Age: 38
End: 2023-05-08
Payer: MEDICAID

## 2023-05-08 DIAGNOSIS — R10.9 STOMACH PAIN: Primary | ICD-10-CM

## 2023-05-09 RX ORDER — DICYCLOMINE HYDROCHLORIDE 10 MG/1
10 CAPSULE ORAL 3 TIMES DAILY PRN
Qty: 30 CAPSULE | Refills: 0 | Status: SHIPPED | OUTPATIENT
Start: 2023-05-09 | End: 2023-06-08

## 2023-05-09 NOTE — TELEPHONE ENCOUNTER
----- Message from Laureen Hernandez sent at 5/9/2023 11:50 AM CDT -----  Regarding: refill  Dicyclomine, does see specialist until 10 days from now and it helps with her stomach.    [phone]

## 2023-05-25 ENCOUNTER — TELEPHONE (OUTPATIENT)
Dept: FAMILY MEDICINE | Facility: CLINIC | Age: 38
End: 2023-05-25
Payer: MEDICAID

## 2023-05-25 NOTE — TELEPHONE ENCOUNTER
Left message for pt to call chiropractor herself, no need for referral because insurance will not pay for the services

## 2023-06-05 ENCOUNTER — HOSPITAL ENCOUNTER (OUTPATIENT)
Dept: RADIOLOGY | Facility: HOSPITAL | Age: 38
Discharge: HOME | End: 2023-06-05
Attending: PSYCHIATRY & NEUROLOGY
Payer: MEDICAID

## 2023-06-05 DIAGNOSIS — M54.2 CERVICALGIA: ICD-10-CM

## 2023-06-05 DIAGNOSIS — M54.6 PAIN IN THORACIC SPINE: ICD-10-CM

## 2023-06-05 DIAGNOSIS — G44.81 HYPNIC HEADACHE: ICD-10-CM

## 2023-06-05 PROCEDURE — 70551 MRI BRAIN STEM W/O DYE: CPT | Mod: TC

## 2023-06-05 PROCEDURE — 30000890 MAYO GENERIC ORDERABLE

## 2023-06-05 PROCEDURE — 72146 MRI CHEST SPINE W/O DYE: CPT | Mod: TC

## 2023-06-05 PROCEDURE — 84591 ASSAY OF NOS VITAMIN: CPT

## 2023-06-05 PROCEDURE — 72141 MRI NECK SPINE W/O DYE: CPT | Mod: TC

## 2023-06-06 ENCOUNTER — OFFICE VISIT (OUTPATIENT)
Dept: FAMILY MEDICINE | Facility: CLINIC | Age: 38
End: 2023-06-06
Payer: MEDICAID

## 2023-06-06 VITALS
TEMPERATURE: 98 F | WEIGHT: 148.81 LBS | DIASTOLIC BLOOD PRESSURE: 70 MMHG | SYSTOLIC BLOOD PRESSURE: 100 MMHG | OXYGEN SATURATION: 98 % | HEIGHT: 64 IN | HEART RATE: 84 BPM | BODY MASS INDEX: 25.41 KG/M2

## 2023-06-06 DIAGNOSIS — Z98.890 HISTORY OF AUGMENTATION OF BOTH BREASTS: ICD-10-CM

## 2023-06-06 DIAGNOSIS — Z98.82 BREAST IMPLANT STATUS: ICD-10-CM

## 2023-06-06 DIAGNOSIS — E03.9 HYPOTHYROIDISM, UNSPECIFIED TYPE: Primary | ICD-10-CM

## 2023-06-06 DIAGNOSIS — R51.9 FREQUENT HEADACHES: ICD-10-CM

## 2023-06-06 DIAGNOSIS — Z59.00 LACK OF HOUSING: ICD-10-CM

## 2023-06-06 PROCEDURE — 3008F BODY MASS INDEX DOCD: CPT | Mod: CPTII,,, | Performed by: NURSE PRACTITIONER

## 2023-06-06 PROCEDURE — 3074F SYST BP LT 130 MM HG: CPT | Mod: CPTII,,, | Performed by: NURSE PRACTITIONER

## 2023-06-06 PROCEDURE — 3008F PR BODY MASS INDEX (BMI) DOCUMENTED: ICD-10-PCS | Mod: CPTII,,, | Performed by: NURSE PRACTITIONER

## 2023-06-06 PROCEDURE — 3078F DIAST BP <80 MM HG: CPT | Mod: CPTII,,, | Performed by: NURSE PRACTITIONER

## 2023-06-06 PROCEDURE — 1159F MED LIST DOCD IN RCRD: CPT | Mod: CPTII,,, | Performed by: NURSE PRACTITIONER

## 2023-06-06 PROCEDURE — 99214 PR OFFICE/OUTPT VISIT, EST, LEVL IV, 30-39 MIN: ICD-10-PCS | Mod: ,,, | Performed by: NURSE PRACTITIONER

## 2023-06-06 PROCEDURE — 1160F PR REVIEW ALL MEDS BY PRESCRIBER/CLIN PHARMACIST DOCUMENTED: ICD-10-PCS | Mod: CPTII,,, | Performed by: NURSE PRACTITIONER

## 2023-06-06 PROCEDURE — 3078F PR MOST RECENT DIASTOLIC BLOOD PRESSURE < 80 MM HG: ICD-10-PCS | Mod: CPTII,,, | Performed by: NURSE PRACTITIONER

## 2023-06-06 PROCEDURE — 99214 OFFICE O/P EST MOD 30 MIN: CPT | Mod: ,,, | Performed by: NURSE PRACTITIONER

## 2023-06-06 PROCEDURE — 1159F PR MEDICATION LIST DOCUMENTED IN MEDICAL RECORD: ICD-10-PCS | Mod: CPTII,,, | Performed by: NURSE PRACTITIONER

## 2023-06-06 PROCEDURE — 3074F PR MOST RECENT SYSTOLIC BLOOD PRESSURE < 130 MM HG: ICD-10-PCS | Mod: CPTII,,, | Performed by: NURSE PRACTITIONER

## 2023-06-06 PROCEDURE — 1160F RVW MEDS BY RX/DR IN RCRD: CPT | Mod: CPTII,,, | Performed by: NURSE PRACTITIONER

## 2023-06-06 RX ORDER — DOXEPIN HYDROCHLORIDE 10 MG/1
1 CAPSULE ORAL DAILY
COMMUNITY
Start: 2023-05-22

## 2023-06-06 RX ORDER — METHOCARBAMOL 750 MG/1
750 TABLET, FILM COATED ORAL 4 TIMES DAILY PRN
COMMUNITY
Start: 2023-06-02

## 2023-06-06 RX ORDER — INDOMETHACIN 25 MG/1
25-50 CAPSULE ORAL 3 TIMES DAILY PRN
COMMUNITY
Start: 2023-05-25

## 2023-06-06 RX ORDER — LEVOTHYROXINE SODIUM 50 UG/1
50 TABLET ORAL DAILY
Qty: 90 TABLET | Refills: 3 | Status: SHIPPED | OUTPATIENT
Start: 2023-06-06 | End: 2024-06-05

## 2023-06-06 RX ORDER — PENTOXIFYLLINE 400 MG/1
400 TABLET, EXTENDED RELEASE ORAL 3 TIMES DAILY
COMMUNITY
Start: 2023-05-25

## 2023-06-06 RX ORDER — KETOROLAC TROMETHAMINE 30 MG/ML
60 INJECTION, SOLUTION INTRAMUSCULAR; INTRAVENOUS ONCE
Status: COMPLETED | OUTPATIENT
Start: 2023-06-06 | End: 2023-06-06

## 2023-06-06 RX ORDER — CARBAMAZEPINE 100 MG/1
100-200 TABLET, CHEWABLE ORAL 3 TIMES DAILY
COMMUNITY
Start: 2023-05-25

## 2023-06-06 RX ORDER — MELATONIN 10 MG
1 CAPSULE ORAL NIGHTLY
COMMUNITY
Start: 2023-05-15

## 2023-06-06 RX ORDER — TIZANIDINE 4 MG/1
1 TABLET ORAL 3 TIMES DAILY PRN
COMMUNITY
Start: 2023-05-25

## 2023-06-06 RX ADMIN — KETOROLAC TROMETHAMINE 60 MG: 30 INJECTION, SOLUTION INTRAMUSCULAR; INTRAVENOUS at 02:06

## 2023-06-06 SDOH — SOCIAL DETERMINANTS OF HEALTH (SDOH): HOMELESSNESS UNSPECIFIED: Z59.00

## 2023-06-06 NOTE — PROGRESS NOTES
Patient ID: Bethanie Umana is a 37 y.o. female.    Chief Complaint: Results                     History of Present Illness:  The patient is 37 y.o. White female for evaluation and management with a chief complaint of Results of thyroid labs.  She continues to complain of chronic neck pain.  Recently established care with Dr. Landa will prescribed Robaxin, Zanaflex, tramadol, and Tegretol to assist with chronic pain and headaches.  Today, she complains of a severe headache that starts in occipital region and radiates into bilateral temporal regions.  Headache is associated with nausea and photophobia.  Began about 48 hours ago.    She would also like to discuss removal of breast implants because they have been very painful since 2017.    Voices concern regarding living situation and transportation.    ROS: See HPI      Past Medical History:  has a past medical history of Anxiety, Breast pain, Cervicalgia, Fibromyalgia, Frequent headaches, History of pulmonary embolism, Hypothyroidism, Iron deficiency anemia, Major depression, recurrent, Multiple thyroid nodules, Nerve damage, Polyarthritis, and PTSD (post-traumatic stress disorder).    Current Outpatient Medications   Medication Instructions    ADDERALL XR 30 mg 24 hr capsule 1 capsule, Oral, Every morning    busPIRone (BUSPAR) 7.5 mg, Oral, Daily    carBAMazepine (TEGRETOL) 100-200 mg, Oral, 3 times daily    dicyclomine (BENTYL) 10 mg, Oral, 3 times daily PRN    doxepin (SINEQUAN) 10 MG capsule 1 capsule, Oral, Daily    gabapentin (NEURONTIN) 100 MG capsule 1 capsule, Oral, 2 times daily    gabapentin (NEURONTIN) 400 mg, Oral, Nightly    indomethacin (INDOCIN) 25-50 mg, Oral, 3 times daily PRN    levothyroxine (SYNTHROID) 50 mcg, Oral, Daily    melatonin 10 mg Cap 1 capsule, Oral, Nightly    methocarbamoL (ROBAXIN) 750 mg, Oral, 4 times daily PRN    ondansetron (ZOFRAN) 4 mg, Oral, Every 6 hours    pentoxifylline (TRENTAL) 400 mg, Oral, 3 times daily     "propranoloL (INDERAL) 10 mg, Oral, 2 times daily    tiZANidine (ZANAFLEX) 4 MG tablet 1 tablet, Oral, 3 times daily PRN    WELLBUTRIN  mg, Oral, Every morning       Patient is allergic to latex, penicillin, sulfa (sulfonamide antibiotics), sulfur, and adhesive tape-silicones.       Visit Vitals  /70 (BP Location: Right arm, Patient Position: Sitting)   Pulse 84   Temp 98.2 °F (36.8 °C) (Temporal)   Ht 5' 4" (1.626 m)   Wt 67.5 kg (148 lb 13 oz)   LMP 05/24/2023 (Approximate)   SpO2 98%   BMI 25.54 kg/m²         Physical Exam  Vitals reviewed.   Constitutional:       Appearance: Normal appearance.   Cardiovascular:      Rate and Rhythm: Normal rate and regular rhythm.      Heart sounds: Normal heart sounds.   Pulmonary:      Effort: Pulmonary effort is normal. No respiratory distress.      Breath sounds: Normal breath sounds.   Abdominal:      General: Bowel sounds are normal.      Palpations: Abdomen is soft.      Tenderness: There is no abdominal tenderness.   Musculoskeletal:      Cervical back: Normal range of motion and neck supple. Tenderness present.   Lymphadenopathy:      Cervical: No cervical adenopathy.   Skin:     General: Skin is warm and dry.   Neurological:      Mental Status: She is alert.   Psychiatric:         Mood and Affect: Mood normal.         Assessment/Plan:    ICD-10-CM ICD-9-CM   1. Hypothyroidism, unspecified type  E03.9 244.9   2. Frequent headaches  R51.9 784.0   3. History of augmentation of both breasts  Z98.890 V45.89   4. Lack of housing  Z59.00 V60.0       1. Hypothyroidism, unspecified type  TSH 2.66, free T4 1.09  Continue levothyroxine 50 mcg daily  - levothyroxine (SYNTHROID) 50 MCG tablet; Take 1 tablet (50 mcg total) by mouth once daily.  Dispense: 90 tablet; Refill: 3    2. Frequent headaches  Administer Toradol 60 mg IM x1 today   Continue follow-up with Neurology  - ketorolac injection 60 mg    3. History of augmentation of both breasts  Refer to breast surgeon " at patient's request       4. Lack of housing  Marina paperwork completed          Follow up in about 3 months (around 9/6/2023) for thyroid, labs prior . or sooner as needed.    Future Appointments   Date Time Provider Department Center   7/13/2023 11:00 AM RHEUMATOLOGY FELLOW 1 [facility]   8/22/2023  9:30 AM LAB, [facility] DRAW STATION PRIYA JAZ Caro   8/29/2023  1:00 PM [facility]ST[facility] Stern [facility]         ANASTASIYA Stern

## 2023-06-07 ENCOUNTER — TELEPHONE (OUTPATIENT)
Dept: PLASTIC SURGERY | Facility: CLINIC | Age: 38
End: 2023-06-07
Payer: MEDICAID

## 2023-06-07 NOTE — TELEPHONE ENCOUNTER
----- Message from Pricila Yousif LPN sent at 6/7/2023  2:28 PM CDT -----  Hey this somehow got send to DR. Marina zamarripa   ----- Message -----  From: Leticia Grove  Sent: 6/7/2023   2:27 PM CDT  To: Nate Salazar Staff    Patient is returning  Kristal call..Please call her back at [phone]

## 2023-06-08 ENCOUNTER — TELEPHONE (OUTPATIENT)
Dept: FAMILY MEDICINE | Facility: CLINIC | Age: 38
End: 2023-06-08
Payer: MEDICAID

## 2023-06-08 NOTE — TELEPHONE ENCOUNTER
----- Message from Laureen Hernandez sent at 6/8/2023  9:06 AM CDT -----  Regarding: call back  Tear ducks looks like puss, if you touch it it is hurting, eye is puffy, very painful    [phone]